# Patient Record
Sex: FEMALE | URBAN - METROPOLITAN AREA
[De-identification: names, ages, dates, MRNs, and addresses within clinical notes are randomized per-mention and may not be internally consistent; named-entity substitution may affect disease eponyms.]

---

## 2019-06-18 ENCOUNTER — OFFICE VISIT (OUTPATIENT)
Dept: URBAN - METROPOLITAN AREA CLINIC 48 | Facility: CLINIC | Age: 79
End: 2019-06-18
Payer: MEDICARE

## 2019-06-18 DIAGNOSIS — H40.1121 PRIMARY OPEN-ANGLE GLAUCOMA, LEFT EYE, MILD STAGE: Primary | ICD-10-CM

## 2019-06-18 PROCEDURE — 92004 COMPRE OPH EXAM NEW PT 1/>: CPT | Performed by: OPHTHALMOLOGY

## 2019-06-18 PROCEDURE — 92133 CPTRZD OPH DX IMG PST SGM ON: CPT | Performed by: OPHTHALMOLOGY

## 2019-06-18 PROCEDURE — 92083 EXTENDED VISUAL FIELD XM: CPT | Performed by: OPHTHALMOLOGY

## 2019-06-18 PROCEDURE — 76514 ECHO EXAM OF EYE THICKNESS: CPT | Performed by: OPHTHALMOLOGY

## 2019-06-18 PROCEDURE — 92020 GONIOSCOPY: CPT | Performed by: OPHTHALMOLOGY

## 2019-06-18 RX ORDER — LATANOPROST 50 UG/ML
0.005 % SOLUTION OPHTHALMIC
Qty: 1 | Refills: 6 | Status: ACTIVE
Start: 2019-06-18

## 2019-06-18 ASSESSMENT — INTRAOCULAR PRESSURE
OD: 17
OS: 19

## 2019-06-18 NOTE — IMPRESSION/PLAN
Impression: Primary open-angle glaucoma, left eye, mild stage: H40.1121. Plan: Discussed and reviewed diagnosis with patient today, understood by patient, discussed reviewed VF and OCT with patient today, intraocular pressure stable with medication. Continue medications and observe. Importance of compliance with medications and regular follow-up reiterated, will continue to monitor. Will d/c Latanoprost to right eye, and will continue Latanoprost qhs os.